# Patient Record
Sex: MALE | Race: WHITE | NOT HISPANIC OR LATINO | Employment: OTHER | URBAN - METROPOLITAN AREA
[De-identification: names, ages, dates, MRNs, and addresses within clinical notes are randomized per-mention and may not be internally consistent; named-entity substitution may affect disease eponyms.]

---

## 2018-03-07 ENCOUNTER — OFFICE VISIT (OUTPATIENT)
Dept: URGENT CARE | Facility: CLINIC | Age: 83
End: 2018-03-07
Payer: MEDICARE

## 2018-03-07 VITALS
SYSTOLIC BLOOD PRESSURE: 156 MMHG | HEART RATE: 68 BPM | OXYGEN SATURATION: 97 % | WEIGHT: 207 LBS | DIASTOLIC BLOOD PRESSURE: 73 MMHG | BODY MASS INDEX: 30.66 KG/M2 | RESPIRATION RATE: 16 BRPM | HEIGHT: 69 IN | TEMPERATURE: 98 F

## 2018-03-07 DIAGNOSIS — M72.2 PLANTAR FASCIITIS: Primary | ICD-10-CM

## 2018-03-07 PROCEDURE — 99203 OFFICE O/P NEW LOW 30 MIN: CPT | Mod: S$GLB,,, | Performed by: INTERNAL MEDICINE

## 2018-03-07 RX ORDER — PREDNISONE 10 MG/1
10 TABLET ORAL DAILY
Qty: 5 TABLET | Refills: 0 | Status: SHIPPED | OUTPATIENT
Start: 2018-03-07 | End: 2018-03-12

## 2018-03-07 RX ORDER — NAPROXEN 500 MG/1
250 TABLET ORAL 2 TIMES DAILY WITH MEALS
Qty: 12 TABLET | Refills: 0 | Status: SHIPPED | OUTPATIENT
Start: 2018-03-07 | End: 2018-03-13

## 2018-03-07 RX ORDER — EZETIMIBE 10 MG/1
10 TABLET ORAL DAILY
COMMUNITY

## 2018-03-07 RX ORDER — ASPIRIN 81 MG/1
81 TABLET ORAL DAILY
COMMUNITY

## 2018-03-07 RX ORDER — AMOXICILLIN 500 MG
CAPSULE ORAL DAILY
COMMUNITY

## 2018-03-07 RX ORDER — ALLOPURINOL 300 MG/1
300 TABLET ORAL DAILY
COMMUNITY

## 2018-03-07 RX ORDER — AMLODIPINE BESYLATE 5 MG/1
5 TABLET ORAL DAILY
COMMUNITY

## 2018-03-07 RX ORDER — METOPROLOL SUCCINATE 25 MG/1
25 TABLET, EXTENDED RELEASE ORAL DAILY
COMMUNITY

## 2018-03-08 NOTE — PATIENT INSTRUCTIONS
Plantar Fasciitis  Plantar fasciitis is a painful swelling of the plantar fascia. The plantar fascia is a thick, fibrous layer of tissue. It covers the bones on the bottom of your foot. And it supports the foot bones in an arched position.  This can happen gradually or suddenly. It usually affects one foot at a time. Heel pain can be sharp, like a knife sticking into the bottom of your foot. You may feel pain after exercising, long-distance jogging, stair climbing, long periods of standing, or after standing up.  Risk factors include: non-active lifestyle, arthritis, diabetes, obesity or recent weight gain, flat foot, high arch. Wearing high heels, loose shoes, or shoes with poor arch support for long periods of time adds to the risk. This problem is commonly found in runners and dancers. It also found in people who stand on hard surfaces for long periods of time.  Foot pain from this condition is usually worse in the morning. But it improves with walking. By the end of the day there may be a dull aching. Treatment requires short-term rest and controlling swelling. It may take up to 9 months before all symptoms go away. Rarely, a steroid injection into the foot, or surgery, may be needed.  Home care  · If you are overweight, lose weight to help healing.  · Choose supportive shoes with good arch support and shock absorbency. Replace athletic shoes when they become worn out. Dont walk or run barefoot.  · Premade or custom-fitted shoe inserts may be helpful. Inserts made of silicone seem to be the most effective. Custom-made inserts can be provided by a podiatrist or foot specialist, physical therapist, or orthopedist.  · Premade or custom-made night splints keep the heel stretched out while you sleep. They may prevent morning pain.  · Avoid activities that stress the feet: jogging, prolonged standing or walking, contact sports, etc.  · First thing in the morning and before sports, stretch the bottom of your feet.  Gently flex your ankle so the toes move toward your knee.  · Icing may help control heel pain. Apply an ice pack to the heel for 10-20 minutes as a preventive. Or ice your heel after a severe flare-up of symptoms. You may repeat this every 1-2 hours as needed.  · You may use over-the-counter pain medicine to control pain, unless another medicine was prescribed. Anti-inflammatory pain medicines, such as ibuprofen or naproxen, may work better than acetaminophen. If you have chronic liver or kidney disease or ever had a stomach ulcer or GI bleeding, talk with your healthcare provider before using these medicines.  Follow-up care  Follow up with your healthcare provider, physical therapist, or podiatrist or foot specialist as advised.  Call for an appointment if pain worsens or there is no relief after a few weeks of home treatment. Shoe inserts, a night splint, or a special boot may be required.  If X-rays were taken, you will be told of any new findings that may affect your care.  When to seek medical advice  Call your healthcare provider right away if any of these occur:  · Foot swelling  · Redness with increasing pain  Date Last Reviewed: 11/21/2015  © 3120-9450 [employer]. [address] [place] [address]. All rights reserved. This information is not intended as a substitute for professional medical care. Always follow your healthcare professional's instructions.  Please return here or go to the Emergency Department for any concerns or worsening of condition.  If you were prescribed antibiotics, please take them to completion.  If you were prescribed a narcotic medication, do not drive or operate heavy equipment or machinery while taking these medications.  Please follow up with your primary care doctor or specialist as needed.

## 2018-03-10 ENCOUNTER — TELEPHONE (OUTPATIENT)
Dept: URGENT CARE | Facility: CLINIC | Age: 83
End: 2018-03-10

## 2018-05-19 ENCOUNTER — OFFICE VISIT (OUTPATIENT)
Dept: URGENT CARE | Facility: CLINIC | Age: 83
End: 2018-05-19
Payer: MEDICARE

## 2018-05-19 VITALS
TEMPERATURE: 98 F | RESPIRATION RATE: 16 BRPM | BODY MASS INDEX: 30.92 KG/M2 | SYSTOLIC BLOOD PRESSURE: 146 MMHG | DIASTOLIC BLOOD PRESSURE: 71 MMHG | WEIGHT: 204 LBS | HEIGHT: 68 IN | HEART RATE: 64 BPM | OXYGEN SATURATION: 98 %

## 2018-05-19 DIAGNOSIS — M75.52 BURSITIS OF DELTOID, LEFT: ICD-10-CM

## 2018-05-19 DIAGNOSIS — M77.52 BURSITIS OF LEFT FOOT: Primary | ICD-10-CM

## 2018-05-19 PROCEDURE — 99214 OFFICE O/P EST MOD 30 MIN: CPT | Mod: S$GLB,,, | Performed by: NURSE PRACTITIONER

## 2018-05-19 RX ORDER — METHYLPREDNISOLONE 4 MG/1
4 TABLET ORAL DAILY
Qty: 1 PACKAGE | Refills: 0 | Status: SHIPPED | OUTPATIENT
Start: 2018-05-19 | End: 2019-05-19

## 2018-05-19 RX ORDER — TRAMADOL HYDROCHLORIDE 50 MG/1
50 TABLET ORAL DAILY
Qty: 10 TABLET | Refills: 0 | Status: SHIPPED | OUTPATIENT
Start: 2018-05-19 | End: 2018-05-29

## 2018-05-19 NOTE — PROGRESS NOTES
"Subjective:       Patient ID: Gerhard Valderrama is a 85 y.o. female.    Vitals:  height is 5' 8" (1.727 m) and weight is 92.5 kg (204 lb). Her tympanic temperature is 98.3 °F (36.8 °C). Her blood pressure is 146/71 (abnormal) and her pulse is 64. Her respiration is 16 and oxygen saturation is 98%.     Chief Complaint: Arm Pain (Upper arm)    Patient is 85 year old male with left biceps pain for  36 hours; no trauma; no FOOSH;       Arm Pain    The incident occurred 12 to 24 hours ago (Started yesterday). The incident occurred at home. There was no injury mechanism. The pain is present in the left elbow (Left upper arm). Quality: soreness. The pain does not radiate. The pain is at a severity of 10/10. The pain is severe. The pain has been worsening since the incident. Pertinent negatives include no chest pain. She has tried acetaminophen for the symptoms. The treatment provided no relief.     Review of Systems   Constitution: Negative for chills and fever.   HENT: Positive for hoarse voice. Negative for sore throat.    Eyes: Negative for blurred vision.   Cardiovascular: Negative for chest pain.   Respiratory: Positive for cough. Negative for shortness of breath.    Skin: Negative for rash.   Musculoskeletal: Positive for arthritis and muscle weakness. Negative for back pain and joint pain.        Left biceps pain on awakening;   Gastrointestinal: Negative for abdominal pain, diarrhea, nausea and vomiting.   Neurological: Negative for headaches.   Psychiatric/Behavioral: The patient is not nervous/anxious.        Objective:      Physical Exam   Constitutional: She appears well-developed and well-nourished. No distress.   HENT:   Head: Normocephalic and atraumatic.   Right Ear: External ear normal.   Left Ear: External ear normal.   Nose: Nose normal.   Mouth/Throat: Oropharynx is clear and moist.   Eyes: Conjunctivae are normal. Pupils are equal, round, and reactive to light.   Neck: Normal range of motion. Neck supple. "   Cardiovascular: Normal rate, regular rhythm, normal heart sounds and intact distal pulses.    Pulmonary/Chest: Effort normal and breath sounds normal. No respiratory distress. She has no wheezes. She has no rales.   Musculoskeletal: Normal range of motion. She exhibits no deformity.   Left biceps pain;   Skin: Skin is warm and dry. Capillary refill takes 2 to 3 seconds. She is not diaphoretic.   Psychiatric: She has a normal mood and affect. Her behavior is normal. Judgment and thought content normal.       Assessment:   Left deltoid burtsitis      Plan:     steroid oral until finished; ultram as ordered;    Bursitis of left foot  -     methylPREDNISolone (MEDROL DOSEPACK) 4 mg tablet; Take 1 tablet (4 mg total) by mouth once daily. use as directed  Dispense: 1 Package; Refill: 0  -     traMADol (ULTRAM) 50 mg tablet; Take 1 tablet (50 mg total) by mouth once daily.  Dispense: 10 tablet; Refill: 0

## 2018-05-19 NOTE — PATIENT INSTRUCTIONS
"  Common Myths About Pain Medicines     Talk to your doctor if you have any concerns about taking pain medications.     Chronic pain is long-lasting pain due to a condition that can't be cured or easily treated. In many cases, the underlying cause of chronic pain cannot be clearly found. Being in pain can be exhausting. It can affect your ability to eat, sleep, or just do day-to-day tasks. Pain medicines can help relieve some of your pain and make daily life easier. They are likely to be part of your treatment for chronic pain. Below are some common myths about pain medicines.     Myth: Medicines will cure my pain.  Fact: Medicines can help control chronic pain, but they rarely cure it.  Myth: If my usual dose helps a little, a larger dose will help a lot.  Fact: Taking a larger dose of medicines may be dangerous, even fatal. If you feel that you need to increase your dose, consult your health care provider.  Myth: I shouldn't take medicines unless I'm in severe pain.  Fact: Preventing pain is easier than treating it once it has begun. You may even need less medicine. For best results, take pain medicines on schedule.  Myth: Taking pain pills means I'm weak.  Fact: Feeling pain is not a moral failing. It is a medical problem. Taking medicine can help you get more out of your other treatments and allow you to participate in more things.  Myth: I'll get addicted.  Fact: Addiction means severe changes in your behavior, and an impaired ability to control the behavior. Addiction to opioid pain medicines like morphine and oxycodone is fairly uncommon. Addiction to non-opioid pain medicines like acetaminophen and ibuprofen doesn't generally happen. Long-term use of opioid pain medicines may lead to increased tolerance (needing to take more for the same effect) or it may cause physical dependence. This means that to avoid withdrawal, you'll need to "taper off" if you and your health care provider decide to stop the " medicines. Tolerance and dependence are normal responses to opioid pain medicines and are not the same as addiction.  Date Last Reviewed: 6/15/2015  © 6400-5136 [employer]. [address] [place] [address], [place] [address]. All rights reserved. This information is not intended as a substitute for professional medical care. Always follow your healthcare professional's instructions.        Bursitis  You have bursitis. This is an inflammation of the bursa. These are small, fluid-filled sacs that surround the larger joints of the body. The bursa help the muscles and tendons move smoothly over the joints.  Bursitis often happens in the shoulder. But it can also affect the elbows, hips, pelvis, knees, toes, and heels. Bursitis can be caused by injury, overuse of the joint, or infection of the bursa. Symptoms include pain and tenderness over a joint. Symptoms get worse with movement.  Bursitis is treated with an anti-inflammatory medicine and by resting the joint. More severe cases require injection of medicine directly into the bursa.    Home care  · Rest the painful joint and protect it from movement. This will allow the inflammation to heal faster.  · Apply an ice pack over the injured area for no more than 15 to 20 minutes. Do this every 3 to 6 hours for the first 24 to 48 hours. Keep using ice packs 3 to 4 times a day until the pain and swelling improves.   · To make an ice pack, put ice cubes in a sealed plastic zip-lock bag. Wrap the bag in a clean, thin towel or cloth. Never put ice or an ice pack directly on the skin. As the ice melts, be careful to avoid getting any wrap or splint wet.  · You may take over-the-counter pain medicine to treat pain and inflammation, unless another medicine was prescribed. Anti-inflammatory pain medicines may be more effective. Talk with your provider beforeusing these medicines if you have chronic liver or kidney disease, or ever had a stomach ulcer or GI (gastrointestinal)  bleeding.  · As your symptoms improve, slowly begin to move the joint. Do not overuse the joint. This may cause the symptoms to flare up again.  When to seek medical advice  Call your healthcare provider right away if any of these occur:  · Redness over the painful area  · Increasing pain or swelling at the joint  · Fever of 100.4°F (38°C) or above lasting for 24 to 48 hours  Date Last Reviewed: 11/21/2015  © 6151-2780 [employer]. [address] [place] [address]. All rights reserved. This information is not intended as a substitute for professional medical care. Always follow your healthcare professional's instructions.

## 2018-05-22 ENCOUNTER — TELEPHONE (OUTPATIENT)
Dept: URGENT CARE | Facility: CLINIC | Age: 83
End: 2018-05-22

## 2020-01-16 ENCOUNTER — OFFICE VISIT (OUTPATIENT)
Dept: URGENT CARE | Facility: CLINIC | Age: 85
End: 2020-01-16
Payer: MEDICARE

## 2020-01-16 VITALS
DIASTOLIC BLOOD PRESSURE: 71 MMHG | BODY MASS INDEX: 30.92 KG/M2 | WEIGHT: 204 LBS | SYSTOLIC BLOOD PRESSURE: 154 MMHG | HEART RATE: 83 BPM | RESPIRATION RATE: 18 BRPM | TEMPERATURE: 98 F | OXYGEN SATURATION: 84 % | HEIGHT: 68 IN

## 2020-01-16 DIAGNOSIS — N18.9 CHRONIC KIDNEY DISEASE, UNSPECIFIED CKD STAGE: ICD-10-CM

## 2020-01-16 DIAGNOSIS — R06.02 SHORTNESS OF BREATH: Primary | ICD-10-CM

## 2020-01-16 DIAGNOSIS — R09.02 HYPOXIA: ICD-10-CM

## 2020-01-16 DIAGNOSIS — Z86.79 HISTORY OF CHF (CONGESTIVE HEART FAILURE): ICD-10-CM

## 2020-01-16 PROCEDURE — 99215 OFFICE O/P EST HI 40 MIN: CPT | Mod: S$GLB,,, | Performed by: NURSE PRACTITIONER

## 2020-01-16 PROCEDURE — 99215 PR OFFICE/OUTPT VISIT, EST, LEVL V, 40-54 MIN: ICD-10-PCS | Mod: S$GLB,,, | Performed by: NURSE PRACTITIONER

## 2020-01-16 RX ORDER — TRAMADOL HYDROCHLORIDE 50 MG/1
50 TABLET ORAL EVERY 6 HOURS PRN
COMMUNITY

## 2020-01-16 RX ORDER — GABAPENTIN 100 MG/1
100 CAPSULE ORAL 3 TIMES DAILY
COMMUNITY

## 2020-01-16 RX ORDER — CLOPIDOGREL BISULFATE 75 MG/1
75 TABLET ORAL DAILY
COMMUNITY

## 2020-01-16 RX ORDER — FUROSEMIDE 20 MG/1
20 TABLET ORAL 2 TIMES DAILY
COMMUNITY

## 2020-01-16 NOTE — PROGRESS NOTES
"Subjective:       Patient ID: Gerhard Valderrama is a 86 y.o. male.    Vitals:  height is 5' 8" (1.727 m) and weight is 92.5 kg (204 lb). His tympanic temperature is 98.3 °F (36.8 °C). His blood pressure is 154/71 (abnormal) and his pulse is 83. His respiration is 18 and oxygen saturation is 84% (abnormal).     Chief Complaint: Shortness of Breath    Pt brought in by wife and daughter with concern for sob. They are noticing pt is sob but pt denies. Low oxygen levels between 81-86% noted during triage and past medical history which includes CABG, stents, CHF, htn, CAD, CKD no dialysis. Pt has monitored weight at home, usually 207#, went up to 210 yesterday and took fluid pill, fluid pills limited due to kidney disease. Was seen by neurologist yesterday, routine visit, to which family states pt was wheezing. 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     Shortness of Breath   This is a recurrent problem. The current episode started in the past 7 days. The problem occurs constantly. The problem has been gradually worsening. Duration: recurrently through day, improves short time with rest. Associated symptoms include leg swelling (intermittently). Pertinent negatives include no abdominal pain, chest pain, claudication, ear pain, fever, headaches, hemoptysis, neck pain, rash, sore throat, sputum production, syncope, vomiting or wheezing. Nothing aggravates the symptoms. Treatments tried: fluid pill yesterday. The treatment provided no relief. His past medical history is significant for CAD and a heart failure. There is no history of COPD, PE, pneumonia or a recent surgery.       Constitution: Negative for chills, sweating, fatigue and fever.   HENT: Negative for ear pain, congestion, sinus pain, sinus pressure, sore throat and voice change.    Neck: Negative for neck pain, neck stiffness and painful lymph nodes.   Cardiovascular: Positive " for leg swelling (intermittently). Negative for chest pain and passing out.   Eyes: Negative for eye redness.   Respiratory: Positive for shortness of breath. Negative for chest tightness, cough, sputum production, bloody sputum, COPD, stridor, wheezing and asthma.    Gastrointestinal: Negative for abdominal pain, nausea, vomiting and diarrhea.   Genitourinary: Negative for dysuria, frequency and hematuria.   Musculoskeletal: Negative for joint pain, joint swelling and muscle ache.   Skin: Negative for pale and rash.   Allergic/Immunologic: Negative for seasonal allergies and asthma.   Neurological: Negative for dizziness, history of vertigo, light-headedness, passing out and headaches.   Hematologic/Lymphatic: Negative for swollen lymph nodes and easy bruising/bleeding. Does not bruise/bleed easily.       Objective:      Physical Exam   Constitutional: He is oriented to person, place, and time. He appears well-developed and well-nourished. He is cooperative.  Non-toxic appearance. He does not have a sickly appearance. He does not appear ill. No distress.   HENT:   Head: Normocephalic and atraumatic.   Right Ear: Hearing, external ear and ear canal normal.   Left Ear: Hearing, external ear and ear canal normal.   Nose: Nose normal. No mucosal edema, rhinorrhea or nasal deformity. No epistaxis. Right sinus exhibits no maxillary sinus tenderness and no frontal sinus tenderness. Left sinus exhibits no maxillary sinus tenderness and no frontal sinus tenderness.   Mouth/Throat: Uvula is midline, oropharynx is clear and moist and mucous membranes are normal. No trismus in the jaw. Normal dentition. No uvula swelling. No posterior oropharyngeal erythema.   Eyes: Conjunctivae and lids are normal. Right eye exhibits no discharge. Left eye exhibits no discharge. No scleral icterus.   Neck: Trachea normal, normal range of motion, full passive range of motion without pain and phonation normal. Neck supple. No tracheal deviation  present.   Cardiovascular: Normal rate, regular rhythm, normal heart sounds, intact distal pulses and normal pulses.   No murmur heard.  Pulmonary/Chest: Effort normal and breath sounds normal. No respiratory distress. He has no wheezes. He exhibits no tenderness.   Room air saturation as noted, patient with minimal dyspnea, answering questions full sentences.    Abdominal: Soft. Normal appearance and bowel sounds are normal. He exhibits no distension, no pulsatile midline mass and no mass. There is no tenderness.   Musculoskeletal: Normal range of motion. He exhibits no edema or deformity.   Neurological: He is alert and oriented to person, place, and time. He exhibits normal muscle tone. Coordination normal.   Skin: Skin is warm, dry, intact, not diaphoretic and not pale.   Psychiatric: He has a normal mood and affect. His speech is normal and behavior is normal. Judgment and thought content normal. Cognition and memory are normal.   Nursing note and vitals reviewed.        Assessment:       1. Shortness of breath    2. Hypoxia    3. History of CHF (congestive heart failure)    4. Chronic kidney disease, unspecified CKD stage        Plan:     Given pmh, current exam and oxygen level will send to ED. Wife and daughter present who declines EMS transport and will drive pt to [facility] even with discussing risks of private transport. Ruthann VALVERDE notified [facility] of pt going to ED.     Shortness of breath  -     Refer to Emergency Dept.    Hypoxia  -     Refer to Emergency Dept.    History of CHF (congestive heart failure)  -     Refer to Emergency Dept.    Chronic kidney disease, unspecified CKD stage  -     Refer to Emergency Dept.          Patient Instructions   · As discussed, it is recommended to seek emergency care immediately given shortness of breath/dyspnea on exertion and low oxygen levels between 81-86% in urgent care and past medical history which includes CABG, stents. Pt has monitored weight at home,  usually 207#, went up to 210 yesterday and took fluid pill, fluid pills limited due to kidney disease. Was seen by neurologist yesterday, routine visit, to which family states pt was wheezing. No wheezing noted today.   ·   · Follow up with your primary care in 2-5 days if symptoms have not improved, or you may return here.  · If you were referred to a specialist, please follow up with that specialty.  · If you were prescribed antibiotics, please take them to completion.  · If you were prescribed a narcotic or any medication with sedative effects, do not drive or operate heavy equipment or machinery while taking these medications.  · You must understand that you have received treatment at an Urgent Care facility only, and that you may be released before all of your medical problems are known or treated. Urgent Care facilities are not equipped to handle life threatening emergencies. It is recommended that you go to an Emergency Department for further evaluation of worsening or concerning symptoms, or possibly life threatening conditions as discussed.                                        If you  smoke, please stop smoking

## 2020-01-16 NOTE — PATIENT INSTRUCTIONS
· As discussed, it is recommended to seek emergency care immediately given shortness of breath/dyspnea on exertion and low oxygen levels between 81-86% in urgent care and past medical history which includes CABG, stents. Pt has monitored weight at home, usually 207#, went up to 210 yesterday and took fluid pill, fluid pills limited due to kidney disease. Was seen by neurologist yesterday, routine visit, to which family states pt was wheezing. No wheezing noted today.   ·   · Follow up with your primary care in 2-5 days if symptoms have not improved, or you may return here.  · If you were referred to a specialist, please follow up with that specialty.  · If you were prescribed antibiotics, please take them to completion.  · If you were prescribed a narcotic or any medication with sedative effects, do not drive or operate heavy equipment or machinery while taking these medications.  · You must understand that you have received treatment at an Urgent Care facility only, and that you may be released before all of your medical problems are known or treated. Urgent Care facilities are not equipped to handle life threatening emergencies. It is recommended that you go to an Emergency Department for further evaluation of worsening or concerning symptoms, or possibly life threatening conditions as discussed.                                        If you  smoke, please stop smoking

## 2020-01-20 ENCOUNTER — TELEPHONE (OUTPATIENT)
Dept: URGENT CARE | Facility: CLINIC | Age: 85
End: 2020-01-20